# Patient Record
Sex: MALE | Race: WHITE | NOT HISPANIC OR LATINO | Employment: FULL TIME | URBAN - METROPOLITAN AREA
[De-identification: names, ages, dates, MRNs, and addresses within clinical notes are randomized per-mention and may not be internally consistent; named-entity substitution may affect disease eponyms.]

---

## 2018-05-22 ENCOUNTER — OFFICE VISIT (OUTPATIENT)
Dept: INTERNAL MEDICINE | Facility: CLINIC | Age: 33
End: 2018-05-22
Payer: MEDICAID

## 2018-05-22 VITALS
BODY MASS INDEX: 29.8 KG/M2 | HEIGHT: 66 IN | HEART RATE: 55 BPM | WEIGHT: 185.44 LBS | OXYGEN SATURATION: 98 % | DIASTOLIC BLOOD PRESSURE: 81 MMHG | SYSTOLIC BLOOD PRESSURE: 120 MMHG | TEMPERATURE: 98 F

## 2018-05-22 DIAGNOSIS — F41.0 PANIC ATTACK: ICD-10-CM

## 2018-05-22 DIAGNOSIS — F41.9 ANXIETY: ICD-10-CM

## 2018-05-22 DIAGNOSIS — Z00.00 ANNUAL PHYSICAL EXAM: Primary | ICD-10-CM

## 2018-05-22 DIAGNOSIS — Z98.890 HISTORY OF EAR, NOSE, AND THROAT (ENT) SURGERY: ICD-10-CM

## 2018-05-22 DIAGNOSIS — K21.9 GASTROESOPHAGEAL REFLUX DISEASE, ESOPHAGITIS PRESENCE NOT SPECIFIED: ICD-10-CM

## 2018-05-22 PROCEDURE — 99214 OFFICE O/P EST MOD 30 MIN: CPT | Mod: PBBFAC,PO | Performed by: INTERNAL MEDICINE

## 2018-05-22 PROCEDURE — 99385 PREV VISIT NEW AGE 18-39: CPT | Mod: S$PBB,,, | Performed by: INTERNAL MEDICINE

## 2018-05-22 PROCEDURE — 99999 PR PBB SHADOW E&M-EST. PATIENT-LVL IV: CPT | Mod: PBBFAC,,, | Performed by: INTERNAL MEDICINE

## 2018-05-22 RX ORDER — PANTOPRAZOLE SODIUM 40 MG/1
40 TABLET, DELAYED RELEASE ORAL DAILY
Qty: 30 TABLET | Refills: 0 | Status: SHIPPED | OUTPATIENT
Start: 2018-05-22 | End: 2019-06-10

## 2018-05-22 RX ORDER — ESCITALOPRAM OXALATE 10 MG/1
10 TABLET ORAL DAILY
Qty: 30 TABLET | Refills: 0 | Status: SHIPPED | OUTPATIENT
Start: 2018-05-22 | End: 2019-06-10
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
## 2019-06-10 ENCOUNTER — OFFICE VISIT (OUTPATIENT)
Dept: FAMILY MEDICINE | Facility: CLINIC | Age: 34
End: 2019-06-10
Payer: COMMERCIAL

## 2019-06-10 VITALS
HEIGHT: 66 IN | SYSTOLIC BLOOD PRESSURE: 120 MMHG | OXYGEN SATURATION: 95 % | BODY MASS INDEX: 27.46 KG/M2 | HEART RATE: 77 BPM | RESPIRATION RATE: 17 BRPM | WEIGHT: 170.88 LBS | DIASTOLIC BLOOD PRESSURE: 62 MMHG | TEMPERATURE: 99 F

## 2019-06-10 DIAGNOSIS — J02.9 SORE THROAT: ICD-10-CM

## 2019-06-10 DIAGNOSIS — R11.10 VOMITING, INTRACTABILITY OF VOMITING NOT SPECIFIED, PRESENCE OF NAUSEA NOT SPECIFIED, UNSPECIFIED VOMITING TYPE: Primary | ICD-10-CM

## 2019-06-10 DIAGNOSIS — R50.9 FEVER, UNSPECIFIED FEVER CAUSE: ICD-10-CM

## 2019-06-10 DIAGNOSIS — R05.9 COUGH: ICD-10-CM

## 2019-06-10 DIAGNOSIS — R19.7 DIARRHEA, UNSPECIFIED TYPE: ICD-10-CM

## 2019-06-10 LAB
CTP QC/QA: YES
S PYO RRNA THROAT QL PROBE: NEGATIVE

## 2019-06-10 PROCEDURE — 99203 OFFICE O/P NEW LOW 30 MIN: CPT | Mod: S$GLB,,, | Performed by: PHYSICIAN ASSISTANT

## 2019-06-10 PROCEDURE — 3008F PR BODY MASS INDEX (BMI) DOCUMENTED: ICD-10-PCS | Mod: CPTII,S$GLB,, | Performed by: PHYSICIAN ASSISTANT

## 2019-06-10 PROCEDURE — 87880 STREP A ASSAY W/OPTIC: CPT | Mod: QW,S$GLB,, | Performed by: PHYSICIAN ASSISTANT

## 2019-06-10 PROCEDURE — 87147 CULTURE TYPE IMMUNOLOGIC: CPT

## 2019-06-10 PROCEDURE — 99999 PR PBB SHADOW E&M-EST. PATIENT-LVL IV: ICD-10-PCS | Mod: PBBFAC,,, | Performed by: PHYSICIAN ASSISTANT

## 2019-06-10 PROCEDURE — 87081 CULTURE SCREEN ONLY: CPT

## 2019-06-10 PROCEDURE — 87880 POCT RAPID STREP A: ICD-10-PCS | Mod: QW,S$GLB,, | Performed by: PHYSICIAN ASSISTANT

## 2019-06-10 PROCEDURE — 99999 PR PBB SHADOW E&M-EST. PATIENT-LVL IV: CPT | Mod: PBBFAC,,, | Performed by: PHYSICIAN ASSISTANT

## 2019-06-10 PROCEDURE — 3008F BODY MASS INDEX DOCD: CPT | Mod: CPTII,S$GLB,, | Performed by: PHYSICIAN ASSISTANT

## 2019-06-10 PROCEDURE — 99203 PR OFFICE/OUTPT VISIT, NEW, LEVL III, 30-44 MIN: ICD-10-PCS | Mod: S$GLB,,, | Performed by: PHYSICIAN ASSISTANT

## 2019-06-10 RX ORDER — BENZONATATE 200 MG/1
200 CAPSULE ORAL 3 TIMES DAILY PRN
Qty: 30 CAPSULE | Refills: 0 | Status: SHIPPED | OUTPATIENT
Start: 2019-06-10 | End: 2019-06-20

## 2019-06-10 RX ORDER — ONDANSETRON 4 MG/1
4 TABLET, FILM COATED ORAL EVERY 12 HOURS PRN
Qty: 30 TABLET | Refills: 0 | Status: SHIPPED | OUTPATIENT
Start: 2019-06-10 | End: 2019-07-24

## 2019-06-10 NOTE — LETTER
Shamika 10, 2019      [facility]  [address] [place] [address]  Phone: [phone]  Fax: [phone]       Patient: Malik Coronel   YOB: 1985  Date of Visit: 06/10/2019    To Whom It May Concern:    Lulu Coronel  was at [facility] on 06/10/2019.  Please excuse his absence from work 06/09/19 and 06/10/19.  If you have any questions or concerns, or if I can be of further assistance, please do not hesitate to contact me.    Sincerely,        Gayle Martinez PA-C

## 2019-06-10 NOTE — PROGRESS NOTES
Subjective:       Patient ID: Malik Coronel is a 33 y.o. male.    Chief Complaint: Emesis; Fever; Headache; Cough; and Sore Throat    Mr. Coronel comes to clinic today complaining of nausea, vomiting, low grade fever, diarrhea, cough, and sore throat. He reports there is a similar illness going through his home. His children and wife have been sick. The patient reports he has been taking mucinex with no improvement. The patient reports he has not vomited today; he is able to keep food and water down. The patient reports 2-3 episodes of diarrhea daily.     Review of Systems   Constitutional: Negative for activity change, appetite change and fever.   HENT: Negative for postnasal drip, rhinorrhea and sinus pressure.    Eyes: Negative for visual disturbance.   Respiratory: Positive for cough. Negative for shortness of breath.    Cardiovascular: Negative for chest pain.   Gastrointestinal: Positive for diarrhea and nausea. Negative for abdominal distention and abdominal pain.   Genitourinary: Negative for difficulty urinating and dysuria.   Musculoskeletal: Negative for arthralgias and myalgias.   Neurological: Negative for headaches.   Hematological: Negative for adenopathy.   Psychiatric/Behavioral: The patient is not nervous/anxious.        Objective:      Physical Exam   Constitutional: He is oriented to person, place, and time.   HENT:   Mouth/Throat: Oropharynx is clear and moist. No oropharyngeal exudate.   Posterior oropharynx erythematous   Eyes: Pupils are equal, round, and reactive to light. Conjunctivae are normal.   Cardiovascular: Normal rate and regular rhythm.   Pulmonary/Chest: Effort normal and breath sounds normal. He has no wheezes.   Abdominal: Soft. Bowel sounds are normal. There is no tenderness.   Musculoskeletal: He exhibits no edema.   Lymphadenopathy:     He has no cervical adenopathy.   Neurological: He is alert and oriented to person, place, and time.   Skin: No erythema.   Psychiatric: His  behavior is normal.       Assessment:       1. Vomiting, intractability of vomiting not specified, presence of nausea not specified, unspecified vomiting type    2. Fever, unspecified fever cause    3. Diarrhea, unspecified type    4. Cough    5. Sore throat        Plan:   Vomiting, intractability of vomiting not specified, presence of nausea not specified, unspecified vomiting type  -     Comprehensive metabolic panel; Future; Expected date: 06/10/2019  -     CBC auto differential; Future; Expected date: 06/10/2019  -     ondansetron (ZOFRAN) 4 MG tablet; Take 1 tablet (4 mg total) by mouth every 12 (twelve) hours as needed for Nausea.  Dispense: 30 tablet; Refill: 0  Fever, unspecified fever cause  -     Comprehensive metabolic panel; Future; Expected date: 06/10/2019  -     CBC auto differential; Future; Expected date: 06/10/2019  -     POCT rapid strep A    Diarrhea, unspecified type  -     Comprehensive metabolic panel; Future; Expected date: 06/10/2019  -     CBC auto differential; Future; Expected date: 06/10/2019    Cough  -     benzonatate (TESSALON) 200 MG capsule; Take 1 capsule (200 mg total) by mouth 3 (three) times daily as needed for Cough.  Dispense: 30 capsule; Refill: 0  Sore throat  -     POCT rapid strep A  -     Strep A culture, throat    Other orders  -     ondansetron (ZOFRAN) 4 MG tablet; Take 1 tablet (4 mg total) by mouth every 12 (twelve) hours as needed for Nausea.  Dispense: 30 tablet; Refill: 0  -     benzonatate (TESSALON) 200 MG capsule; Take 1 capsule (200 mg total) by mouth 3 (three) times daily as needed for Cough.  Dispense: 30 capsule; Refill: 0

## 2019-06-10 NOTE — PATIENT INSTRUCTIONS

## 2019-06-13 LAB — BACTERIA THROAT CULT: NORMAL

## 2019-09-16 RX ORDER — QUETIAPINE FUMARATE 300 MG/1
TABLET, FILM COATED ORAL
Qty: 15 TABLET | Refills: 0 | OUTPATIENT
Start: 2019-09-16

## 2022-02-05 ENCOUNTER — HOSPITAL ENCOUNTER (EMERGENCY)
Facility: HOSPITAL | Age: 37
Discharge: HOME | End: 2022-02-05
Attending: EMERGENCY MEDICINE
Payer: MEDICAID

## 2022-02-05 VITALS
OXYGEN SATURATION: 98 % | RESPIRATION RATE: 18 BRPM | TEMPERATURE: 98 F | SYSTOLIC BLOOD PRESSURE: 124 MMHG | DIASTOLIC BLOOD PRESSURE: 81 MMHG | HEART RATE: 74 BPM

## 2022-02-05 DIAGNOSIS — L03.115 CELLULITIS OF RIGHT LOWER EXTREMITY: Primary | ICD-10-CM

## 2022-02-05 DIAGNOSIS — Z51.89 VISIT FOR WOUND CHECK: ICD-10-CM

## 2022-02-05 PROCEDURE — 99284 EMERGENCY DEPT VISIT MOD MDM: CPT | Mod: ,,, | Performed by: EMERGENCY MEDICINE

## 2022-02-05 PROCEDURE — 99284 PR EMERGENCY DEPT VISIT,LEVEL IV: ICD-10-PCS | Mod: ,,, | Performed by: EMERGENCY MEDICINE

## 2022-02-05 PROCEDURE — 99283 EMERGENCY DEPT VISIT LOW MDM: CPT

## 2022-02-05 RX ORDER — BACITRACIN ZINC 500 UNIT/G
OINTMENT (GRAM) TOPICAL 2 TIMES DAILY
Qty: 425 G | Refills: 0 | Status: SHIPPED | OUTPATIENT
Start: 2022-02-05 | End: 2022-02-15 | Stop reason: SDUPTHER

## 2022-02-05 RX ORDER — SULFAMETHOXAZOLE AND TRIMETHOPRIM 800; 160 MG/1; MG/1
1 TABLET ORAL 2 TIMES DAILY
Qty: 14 TABLET | Refills: 0 | Status: SHIPPED | OUTPATIENT
Start: 2022-02-05 | End: 2022-02-12
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
## 2022-02-05 NOTE — DISCHARGE INSTRUCTIONS
Take Bactrim twice a day for the next week.  Continue to clean your wound twice a day.  You may use soap and water.  After cleaning, gently dry completely.  Then apply bacitracin or another topical antibiotic ointment.  Use gauze and Ace wrap to help protect it.  Return to the emergency department for any red streaking up your leg, pain, worsening drainage, fevers, or other signs of worsening infection.

## 2024-06-12 ENCOUNTER — TELEPHONE (OUTPATIENT)
Dept: FAMILY MEDICINE | Facility: CLINIC | Age: 39
End: 2024-06-12
Payer: MEDICAID

## 2024-06-12 NOTE — TELEPHONE ENCOUNTER
----- Message from Radha Tanishashorty sent at 6/12/2024  2:20 PM CDT -----  Regarding: sooner apt eca  Contact: gayeance  Type:  Sooner Appointment Request    Caller is requesting a sooner appointment.  Caller declined first available appointment listed below.  Caller will not accept being placed on the waitlist and is requesting a message be sent to doctor.    Name of Caller:  ifrah   When is the first available appointment?    Symptoms:  eca health concerns   Would the patient rather a call back or a response via [facility]?   Best Call Back Number:  [phone]// [phone]    Additional Information:  call to [facility].

## 2024-06-12 NOTE — TELEPHONE ENCOUNTER
None of our providers at [facility] are taking new medicaid pts. Tried to reach pt to advise. No answer. Voice mailbox is full. Unable to leave msg.